# Patient Record
Sex: MALE | Race: WHITE | NOT HISPANIC OR LATINO | Employment: FULL TIME | URBAN - METROPOLITAN AREA
[De-identification: names, ages, dates, MRNs, and addresses within clinical notes are randomized per-mention and may not be internally consistent; named-entity substitution may affect disease eponyms.]

---

## 2017-12-03 ENCOUNTER — APPOINTMENT (EMERGENCY)
Dept: RADIOLOGY | Facility: HOSPITAL | Age: 62
End: 2017-12-03
Payer: COMMERCIAL

## 2017-12-03 ENCOUNTER — HOSPITAL ENCOUNTER (EMERGENCY)
Facility: HOSPITAL | Age: 62
Discharge: HOME | End: 2017-12-03
Attending: EMERGENCY MEDICINE | Admitting: EMERGENCY MEDICINE
Payer: COMMERCIAL

## 2017-12-03 VITALS
WEIGHT: 175 LBS | SYSTOLIC BLOOD PRESSURE: 154 MMHG | HEART RATE: 87 BPM | RESPIRATION RATE: 16 BRPM | BODY MASS INDEX: 23.7 KG/M2 | HEIGHT: 72 IN | DIASTOLIC BLOOD PRESSURE: 83 MMHG | OXYGEN SATURATION: 95 % | TEMPERATURE: 98.6 F
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
## 2017-12-03 DIAGNOSIS — S42.022A CLOSED DISPLACED FRACTURE OF SHAFT OF LEFT CLAVICLE, INITIAL ENCOUNTER: Primary | ICD-10-CM

## 2017-12-03 PROCEDURE — 71020 HB CHEST X-RAY 2VW FRONTAL&LATL: CPT

## 2017-12-03 PROCEDURE — 99283 EMERGENCY DEPT VISIT LOW MDM: CPT

## 2017-12-03 PROCEDURE — 73030 X-RAY EXAM OF SHOULDER: CPT

## 2017-12-03 PROCEDURE — 96372 THER/PROPH/DIAG INJ SC/IM: CPT

## 2017-12-03 PROCEDURE — 73000 X-RAY EXAM OF COLLAR BONE: CPT

## 2017-12-03 RX ORDER — OXYCODONE HYDROCHLORIDE AND ACETAMINOPHEN 5; 325 MG/1; MG/1
1 TABLET ORAL EVERY 6 HOURS PRN
Qty: 15 TABLET | Refills: 0 | Status: SHIPPED | OUTPATIENT
Start: 2017-12-03 | End: 2017-12-06

## 2017-12-03 RX ORDER — CYCLOBENZAPRINE HCL 5 MG
5 TABLET ORAL 3 TIMES DAILY PRN
Qty: 21 TABLET | Refills: 0 | Status: SHIPPED | OUTPATIENT
Start: 2017-12-03 | End: 2017-12-10

## 2017-12-03 RX ORDER — KETOROLAC TROMETHAMINE 30 MG/ML
30 INJECTION, SOLUTION INTRAMUSCULAR; INTRAVENOUS ONCE
Status: COMPLETED | OUTPATIENT
Start: 2017-12-03 | End: 2017-12-03

## 2017-12-03 RX ORDER — LIDOCAINE 50 MG/G
2 PATCH TOPICAL DAILY
Qty: 30 PATCH | Refills: 0 | Status: SHIPPED | OUTPATIENT
Start: 2017-12-03

## 2017-12-03 RX ORDER — NAPROXEN 500 MG/1
500 TABLET ORAL 2 TIMES DAILY WITH MEALS
Qty: 20 TABLET | Refills: 0 | Status: SHIPPED | OUTPATIENT
Start: 2017-12-03 | End: 2017-12-13

## 2017-12-03 RX ADMIN — KETOROLAC TROMETHAMINE 30 MG: 30 INJECTION, SOLUTION INTRAMUSCULAR at 16:03

## 2017-12-03 NOTE — ED PROVIDER NOTES
History  Chief Complaint   Patient presents with    Shoulder Injury     alexasimon states that he fell while skiing and landed on his left shoulder  c/o pain     15-year-old male without past medical history presenting with chief complaint left shoulder injury  Around 11 o'clock this morning the [place] he [place] he landed on his left shoulder he had severe pain localized to his left anterior shoulder  He presents several hours later for evaluation comma the pain is in his left shoulder is nonradiating it is worse when he tries to move his arm it is better with rest he is now holding his left arm in a position of comfort he states that the muscles in his left chest wall feel tight and he has some discomfort from holding his arm in a position of comfort otherwise he denies striking his head he denies anti-platelet or anticoagulant medications he denies neck pain or stiffness he denies weakness paresthesias or anesthesia denies other chest pain or discomfort shortness of breath cough or hemoptysis denies abdominal flank or back pain nausea vomiting other muscle skeletal complaint or injury  Complete review systems otherwise negative as noted            None       History reviewed  No pertinent past medical history  History reviewed  No pertinent surgical history  History reviewed  No pertinent family history  I have reviewed and agree with the history as documented  Social History   Substance Use Topics    Smoking status: Never Smoker    Smokeless tobacco: Never Used    Alcohol use Yes      Comment: 6 pack/week        Review of Systems   Constitutional: Negative for chills and fever  Eyes: Negative for photophobia and pain  Respiratory: Negative for cough and shortness of breath  Cardiovascular: Negative for chest pain and palpitations  Gastrointestinal: Negative for abdominal pain, diarrhea, nausea and vomiting  Musculoskeletal: Positive for joint swelling  Negative for back pain, neck pain and neck stiffness  Left shoulder pain   Skin: Negative for color change, rash and wound  Neurological: Negative for dizziness, weakness, numbness and headaches  Hematological: Negative for adenopathy  Does not bruise/bleed easily  Psychiatric/Behavioral: Negative for agitation and behavioral problems  All other systems reviewed and are negative  Physical Exam  ED Triage Vitals [12/03/17 1254]   Temperature Pulse Respirations Blood Pressure SpO2   98 6 °F (37 °C) 87 16 154/83 95 %      Temp Source Heart Rate Source Patient Position - Orthostatic VS BP Location FiO2 (%)   Temporal Monitor Sitting Right arm --      Pain Score       No Pain           Orthostatic Vital Signs  Vitals:    12/03/17 1254   BP: 154/83   Pulse: 87   Patient Position - Orthostatic VS: Sitting       Physical Exam   Constitutional: He is oriented to person, place, and time  He appears well-developed and well-nourished  No distress  Well-appearing in no acute distress   HENT:   Head: Normocephalic and atraumatic  Eyes: EOM are normal  Pupils are equal, round, and reactive to light  Neck: Normal range of motion  Neck supple  No tracheal deviation present  Cardiovascular: Normal rate, regular rhythm and normal heart sounds  Exam reveals no gallop and no friction rub  No murmur heard  Pulmonary/Chest: Effort normal and breath sounds normal  He has no wheezes  He has no rales  Abdominal: Soft  Bowel sounds are normal  He exhibits no distension  There is no tenderness  There is no rebound and no guarding     Musculoskeletal:   Full active and passive range of motion of his right upper extremity bilateral lower extremities without pain tenderness or injury he has no midline cervical thoracic or lumbar tenderness his back is atraumatic and nontender he has no tenderness over his chest or abdomen he specifically has no tenderness over his left chest wall there is no crepitus, there is some minimal swelling without injury to the skin over his left clavicle there is no tenting he is mildly tender over the anterior shoulder as well although there is no tenderness over her scapula her back there is no tenderness over the humerus elbow forearm hand or wrist the distal left upper extremities neurovascularly intact including AIN PIN, 2+ pulses sensation intact globally, no other injuries noted   Neurological: He is alert and oriented to person, place, and time  No cranial nerve deficit  He exhibits normal muscle tone  Coordination normal    Skin: Skin is warm and dry  No rash noted  Psychiatric: He has a normal mood and affect  His behavior is normal    Nursing note and vitals reviewed  ED Medications  Medications   ketorolac (TORADOL) 30 mg/mL injection 30 mg (30 mg Intramuscular Given 12/3/17 1603)       Diagnostic Studies  Results Reviewed     None                 XR chest 2 views   Final Result by Kristal Reeves MD (12/03 1542)      Acute left clavicle fracture    Clear lungs         Workstation performed: [id number]         XR clavicle LEFT   Final Result by Kristal Reeves MD (12/03 1542)      Acute left clavicle fracture         Workstation performed: [id number]         XR shoulder 2+ views LEFT   Final Result by Kristal Reeves MD (12/03 1541)      Acute left clavicle fracture         Workstation performed: [id number]                    Procedures  Procedures       Phone Contacts  ED Phone Contact    ED Course  ED Course as of Dec 04 1233   Sun Dec 03, 2017   1554 Patient has midshaft left clavicle fracture there is no skin tenting there is no pneumothorax, minimal pain at this time he is right-hand dominant, understands follow up with Orthopedics for definitive management placed in sling pain control close return instructions patient agreeable plan                                MDM  Number of Diagnoses or Management Options  Closed displaced fracture of shaft of left clavicle, initial encounter:   Injury due to skiing accident, initial encounter:   Diagnosis management comments: 59-year-old male denies past medical history presenting several hours after landing on his left shoulder while skiing, pain is located to his left anterior shoulder has minimal swelling over his clavicle without skin tenting or poke hole/ injury to skin, distal arms neurovascularly intact no other injuries noted he has normal vital signs appears comfortable, will get x-ray of the left shoulder and clavicle comma chest comma will treat pain, disposition pending further evaluation and reassessment though likely discharge with orthopedic follow-up and return precautions    CritCare Time    Disposition  Final diagnoses:   Closed displaced fracture of shaft of left clavicle, initial encounter   Injury due to skiing accident, initial encounter     Time reflects when diagnosis was documented in both MDM as applicable and the Disposition within this note     Time User Action Codes Description Comment    12/3/2017  3:57 PM Ceferino Kong 224 Closed displaced fracture of shaft of left clavicle, initial encounter     12/3/2017  3:57 PM Vineet Martin Add Pelontus Natalie  FQBN] XXRC, initial encounter     12/3/2017  3:57 PM Vineet Martin Remove Luctus Natalie  WANL] Fall, initial encounter     12/3/2017  3:57 PM Chetna Kong Add [V00 328A] Injury due to skiing accident, initial encounter       ED Disposition     ED Disposition Condition Comment    Discharge  Andry Sarahme discharge to home/self care      Condition at discharge: Good        Follow-up Information     Follow up With Specialties Details Why Contact Info Additional Information    [facility] Emergency Department Emergency Medicine  If symptoms worsen [address] [facility] [address]  [phone] [place] ED, [address], [place], [address] [facility] [address] [facility] Surgery In 1 week  [address] [facility] [address]  [address], [place] Orthopedic Surgery In 1 week  [address] [facility]           Discharge Medication List as of 12/3/2017  3:59 PM      START taking these medications    Details   cyclobenzaprine (FLEXERIL) 5 mg tablet Take 1 tablet by mouth 3 (three) times a day as needed for muscle spasms for up to 7 days, Starting Sun 12/3/2017, Until Sun 12/10/2017, Print      lidocaine (LIDODERM) 5 % Place 2 patches on the skin daily Remove & Discard patch within 12 hours or as directed by MD, Starting Sun 12/3/2017, Print      naproxen (NAPROSYN) 500 mg tablet Take 1 tablet by mouth 2 (two) times a day with meals for 10 days, Starting Sun 12/3/2017, Until Wed 12/13/2017, Print      oxyCODONE-acetaminophen (PERCOCET) 5-325 mg per tablet Take 1 tablet by mouth every 6 (six) hours as needed for moderate pain for up to 3 days Max Daily Amount: 4 tablets, Starting Sun 12/3/2017, Until Wed 12/6/2017, Print           No discharge procedures on file      ED Provider  Electronically Signed by           Zach Shay DO  12/04/17 9121

## 2017-12-03 NOTE — DISCHARGE INSTRUCTIONS
Please keep the arm in the sling return if you have severe chest pain shortness of breath numbness in her hand fingers or blue you have worsening or other concerning symptoms as instructed otherwise they are must stay in the sling and follow up with Orthopedics in 1 week for further evaluation and definitive treatment as discussed    Clavicle Fracture   WHAT YOU NEED TO KNOW:   A clavicle fracture is a crack or break in your clavicle (collarbone)  DISCHARGE INSTRUCTIONS:   Return to the emergency department if:   · Your shoulder, arm, hand, or fingers turn bluish or pale, or feel cold or numb  · Your pain gets worse, even after rest and medicine  · Your splint feels tight, or you have increased swelling  · You cannot move your fingers  Contact your healthcare provider if:   · Your sling or wrap comes off or gets damaged  · You have questions about your condition or care  Medicines: You may  need any of the following:  · Acetaminophen  decreases pain and fever  It is available without a doctor's order  Ask how much to take and how often to take it  Follow directions  Read the labels of all other medicines you are using to see if they also contain acetaminophen, or ask your doctor or pharmacist  Acetaminophen can cause liver damage if not taken correctly  Do not use more than 4 grams (4,000 milligrams) total of acetaminophen in one day  · NSAIDs , such as ibuprofen, help decrease swelling, pain, and fever  This medicine is available with or without a doctor's order  NSAIDs can cause stomach bleeding or kidney problems in certain people  If you take blood thinner medicine, always ask your healthcare provider if NSAIDs are safe for you  Always read the medicine label and follow directions  · Take your medicine as directed  Contact your healthcare provider if you think your medicine is not helping or if you have side effects  Tell him or her if you are allergic to any medicine   Keep a list of the medicines, vitamins, and herbs you take  Include the amounts, and when and why you take them  Bring the list or the pill bottles to follow-up visits  Carry your medicine list with you in case of an emergency  Follow up with your healthcare provider within 1 week or as directed: You may need to return for more x-rays to see how well your clavicle is healing  Write down your questions so you remember to ask them during your visits  Sling or brace care: You will have a sling or a brace to keep your clavicle from moving while it heals  Ask your healthcare provider for more information on how to care for the sling or brace, including how to adjust it  Ice:  Apply ice on your clavicle for 15 to 20 minutes every hour or as directed  Use an ice pack, or put crushed ice in a plastic bag  Cover it with a towel  Ice decreases swelling and pain  Activity:  Limit activity as directed by your healthcare provider  Slowly start to do more each day as the pain decreases  Physical therapy:  Your healthcare provider may recommend physical therapy after your clavicle heals  A physical therapist teaches you exercises to help improve movement and strength, and to decrease pain  © 2017 [employer]  Information is for End User's use only and may not be sold, redistributed or otherwise used for commercial purposes  All illustrations and images included in CareNotes® are the copyrighted property of [employer]  or Sharad Pollard  The above information is an  only  It is not intended as medical advice for individual conditions or treatments  Talk to your doctor, nurse or pharmacist before following any medical regimen to see if it is safe and effective for you

## 2019-02-21 ENCOUNTER — APPOINTMENT (EMERGENCY)
Dept: CT IMAGING | Facility: HOSPITAL | Age: 64
End: 2019-02-21
Payer: COMMERCIAL

## 2019-02-21 ENCOUNTER — HOSPITAL ENCOUNTER (EMERGENCY)
Facility: HOSPITAL | Age: 64
Discharge: HOME | End: 2019-02-22
Attending: EMERGENCY MEDICINE | Admitting: EMERGENCY MEDICINE
Payer: COMMERCIAL
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
## 2019-02-21 DIAGNOSIS — S20.219A CHEST WALL CONTUSION: ICD-10-CM

## 2019-02-21 LAB
ALBUMIN SERPL BCP-MCNC: 3.4 G/DL (ref 3.5–5)
ALP SERPL-CCNC: 53 U/L (ref 46–116)
ALT SERPL W P-5'-P-CCNC: 30 U/L (ref 12–78)
ANION GAP SERPL CALCULATED.3IONS-SCNC: 1 MMOL/L (ref 4–13)
AST SERPL W P-5'-P-CCNC: 17 U/L (ref 5–45)
BASOPHILS # BLD AUTO: 0.04 THOUSANDS/ΜL (ref 0–0.1)
BASOPHILS NFR BLD AUTO: 1 % (ref 0–1)
BILIRUB SERPL-MCNC: 0.4 MG/DL (ref 0.2–1)
BUN SERPL-MCNC: 22 MG/DL (ref 5–25)
CALCIUM SERPL-MCNC: 8.6 MG/DL (ref 8.3–10.1)
CHLORIDE SERPL-SCNC: 103 MMOL/L (ref 100–108)
CO2 SERPL-SCNC: 26 MMOL/L (ref 21–32)
CREAT SERPL-MCNC: 1.11 MG/DL (ref 0.6–1.3)
EOSINOPHIL # BLD AUTO: 0.11 THOUSAND/ΜL (ref 0–0.61)
EOSINOPHIL NFR BLD AUTO: 2 % (ref 0–6)
ERYTHROCYTE [DISTWIDTH] IN BLOOD BY AUTOMATED COUNT: 12.3 % (ref 11.6–15.1)
GFR SERPL CREATININE-BSD FRML MDRD: 70 ML/MIN/1.73SQ M
GLUCOSE SERPL-MCNC: 101 MG/DL (ref 65–140)
HCT VFR BLD AUTO: 42.3 % (ref 36.5–49.3)
HGB BLD-MCNC: 14.2 G/DL (ref 12–17)
IMM GRANULOCYTES # BLD AUTO: 0.01 THOUSAND/UL (ref 0–0.2)
IMM GRANULOCYTES NFR BLD AUTO: 0 % (ref 0–2)
LYMPHOCYTES # BLD AUTO: 1.53 THOUSANDS/ΜL (ref 0.6–4.47)
LYMPHOCYTES NFR BLD AUTO: 28 % (ref 14–44)
MCH RBC QN AUTO: 32.6 PG (ref 26.8–34.3)
MCHC RBC AUTO-ENTMCNC: 33.6 G/DL (ref 31.4–37.4)
MCV RBC AUTO: 97 FL (ref 82–98)
MONOCYTES # BLD AUTO: 0.64 THOUSAND/ΜL (ref 0.17–1.22)
MONOCYTES NFR BLD AUTO: 12 % (ref 4–12)
NEUTROPHILS # BLD AUTO: 3.16 THOUSANDS/ΜL (ref 1.85–7.62)
NEUTS SEG NFR BLD AUTO: 57 % (ref 43–75)
NRBC BLD AUTO-RTO: 0 /100 WBCS
PLATELET # BLD AUTO: 292 THOUSANDS/UL (ref 149–390)
PMV BLD AUTO: 9.6 FL (ref 8.9–12.7)
POTASSIUM SERPL-SCNC: 3.6 MMOL/L (ref 3.5–5.3)
PROT SERPL-MCNC: 6.5 G/DL (ref 6.4–8.2)
RBC # BLD AUTO: 4.36 MILLION/UL (ref 3.88–5.62)
SODIUM SERPL-SCNC: 130 MMOL/L (ref 136–145)
TROPONIN I SERPL-MCNC: <0.02 NG/ML
WBC # BLD AUTO: 5.49 THOUSAND/UL (ref 4.31–10.16)

## 2019-02-21 PROCEDURE — 85025 COMPLETE CBC W/AUTO DIFF WBC: CPT | Performed by: EMERGENCY MEDICINE

## 2019-02-21 PROCEDURE — 84484 ASSAY OF TROPONIN QUANT: CPT | Performed by: EMERGENCY MEDICINE

## 2019-02-21 PROCEDURE — 71260 CT THORAX DX C+: CPT

## 2019-02-21 PROCEDURE — 99284 EMERGENCY DEPT VISIT MOD MDM: CPT

## 2019-02-21 PROCEDURE — 96374 THER/PROPH/DIAG INJ IV PUSH: CPT

## 2019-02-21 PROCEDURE — 72125 CT NECK SPINE W/O DYE: CPT

## 2019-02-21 PROCEDURE — 70450 CT HEAD/BRAIN W/O DYE: CPT

## 2019-02-21 PROCEDURE — 93005 ELECTROCARDIOGRAM TRACING: CPT

## 2019-02-21 PROCEDURE — 80053 COMPREHEN METABOLIC PANEL: CPT | Performed by: EMERGENCY MEDICINE

## 2019-02-21 PROCEDURE — 36415 COLL VENOUS BLD VENIPUNCTURE: CPT | Performed by: EMERGENCY MEDICINE

## 2019-02-21 RX ORDER — DIAZEPAM 5 MG/ML
5 INJECTION, SOLUTION INTRAMUSCULAR; INTRAVENOUS ONCE
Status: COMPLETED | OUTPATIENT
Start: 2019-02-21 | End: 2019-02-21

## 2019-02-21 RX ADMIN — IOHEXOL 85 ML: 350 INJECTION, SOLUTION INTRAVENOUS at 23:24

## 2019-02-21 RX ADMIN — Medication 5 MG: at 23:09

## 2019-02-22 VITALS
OXYGEN SATURATION: 100 % | TEMPERATURE: 97.8 F | BODY MASS INDEX: 26.16 KG/M2 | WEIGHT: 193.12 LBS | DIASTOLIC BLOOD PRESSURE: 87 MMHG | RESPIRATION RATE: 18 BRPM | HEART RATE: 54 BPM | HEIGHT: 72 IN | SYSTOLIC BLOOD PRESSURE: 156 MMHG

## 2019-02-22 LAB
ATRIAL RATE: 61 BPM
P AXIS: 54 DEGREES
PR INTERVAL: 176 MS
QRS AXIS: 36 DEGREES
QRSD INTERVAL: 90 MS
QT INTERVAL: 402 MS
QTC INTERVAL: 404 MS
T WAVE AXIS: 36 DEGREES
VENTRICULAR RATE: 61 BPM

## 2019-02-22 PROCEDURE — 93010 ELECTROCARDIOGRAM REPORT: CPT | Performed by: INTERNAL MEDICINE

## 2019-02-22 RX ORDER — IBUPROFEN 600 MG/1
600 TABLET ORAL EVERY 6 HOURS PRN
Qty: 30 TABLET | Refills: 0 | Status: SHIPPED | OUTPATIENT
Start: 2019-02-22 | End: 2019-02-25

## 2019-02-22 RX ORDER — IBUPROFEN 600 MG/1
600 TABLET ORAL ONCE
Status: COMPLETED | OUTPATIENT
Start: 2019-02-22 | End: 2019-02-22

## 2019-02-22 RX ORDER — CYCLOBENZAPRINE HCL 10 MG
10 TABLET ORAL 2 TIMES DAILY PRN
Qty: 20 TABLET | Refills: 0 | Status: SHIPPED | OUTPATIENT
Start: 2019-02-22

## 2019-02-22 RX ADMIN — IBUPROFEN 600 MG: 600 TABLET ORAL at 02:08

## 2019-02-22 NOTE — ED PROVIDER NOTES
History  Chief Complaint   Patient presents with    Fall     Pt c/o falling tonight on ice and landing on back/shoulders and then hit head  Pt states that he is tired and neck pain  - thinners  54-year-old male patient presents to the emergency department for evaluation of a trip and fall on ice  The patient states he landed on his shoulders and then landed on the back of his head  The patient was not knocked unconscious  Patient has myriad of symptoms currently including headache which is reproducible with tenderness at the insertion point of the trapezius  The patient has no midline cervical spine tenderness but does have lateral cervical spine tenderness because the patient's symptoms very well have a head CT as well as a cervical spine CT done  The patient states he feels that his breathing is a bit heavier than it was before, he has a history of multiple rib fractures in the past is concerned that he re-injured mother's ribs the patient had a CT scan of the chest done as well  History provided by:  Patient   used: No    Fall   Mechanism of injury: fall    Injury location:  Head/neck  Head/neck injury location:  Head  Arrived directly from scene: no    Fall:     Impact surface:  Unable to specify    Entrapped after fall: no    Protective equipment: none    Suspicion of alcohol use: no    Suspicion of drug use: no    Associated symptoms: chest pain and difficulty breathing    Associated symptoms: no abdominal pain, no back pain, no blindness, no headaches, no hearing loss and no seizures    Risk factors: no anticoagulation therapy, no asthma, no beta blocker therapy, no COPD, no dialysis, no hemophilia, no past MI, not pregnant and no steroid use        Prior to Admission Medications   Prescriptions Last Dose Informant Patient Reported? Taking?    cyclobenzaprine (FLEXERIL) 5 mg tablet   No No   Sig: Take 1 tablet by mouth 3 (three) times a day as needed for muscle spasms for up to 7 days   lidocaine (LIDODERM) 5 %   No No   Sig: Place 2 patches on the skin daily Remove & Discard patch within 12 hours or as directed by MD   naproxen (NAPROSYN) 500 mg tablet   No No   Sig: Take 1 tablet by mouth 2 (two) times a day with meals for 10 days      Facility-Administered Medications: None       History reviewed  No pertinent past medical history  History reviewed  No pertinent surgical history  History reviewed  No pertinent family history  I have reviewed and agree with the history as documented  Social History     Tobacco Use    Smoking status: Never Smoker    Smokeless tobacco: Never Used   Substance Use Topics    Alcohol use: Yes     Comment: 6 pack/week    Drug use: No        Review of Systems   HENT: Negative for hearing loss  Eyes: Negative for blindness  Cardiovascular: Positive for chest pain  Gastrointestinal: Negative for abdominal pain  Musculoskeletal: Negative for back pain  Neurological: Negative for seizures and headaches  All other systems reviewed and are negative  Physical Exam  Physical Exam   Nursing note and vitals reviewed        Vital Signs  ED Triage Vitals [02/21/19 2235]   Temperature Pulse Respirations Blood Pressure SpO2   97 8 °F (36 6 °C) 57 18 165/98 99 %      Temp Source Heart Rate Source Patient Position - Orthostatic VS BP Location FiO2 (%)   Oral Monitor Sitting Left arm --      Pain Score       No Pain           Vitals:    02/21/19 2235 02/22/19 0209   BP: 165/98 156/87   Pulse: 57 (!) 54   Patient Position - Orthostatic VS: Sitting Sitting       Visual Acuity  Visual Acuity      Most Recent Value   L Pupil Size (mm)  3   R Pupil Size (mm)  3          ED Medications  Medications   diazepam (VALIUM) injection 5 mg (5 mg Intravenous Given 2/21/19 2309)   iohexol (OMNIPAQUE) 350 MG/ML injection (MULTI-DOSE) 100 mL (85 mL Intravenous Given 2/21/19 2324)   ibuprofen (MOTRIN) tablet 600 mg (600 mg Oral Given 2/22/19 0208) Diagnostic Studies  Results Reviewed     Procedure Component Value Units Date/Time    Troponin I [[id number]]  (Normal) Collected:  02/21/19 2306    Lab Status:  Final result Specimen:  Blood from Arm, Right Updated:  02/21/19 2329     Troponin I <0 02 ng/mL     Comprehensive metabolic panel [[id number]]  (Abnormal) Collected:  02/21/19 2306    Lab Status:  Final result Specimen:  Blood from Arm, Right Updated:  02/21/19 2327     Sodium 130 mmol/L      Potassium 3 6 mmol/L      Chloride 103 mmol/L      CO2 26 mmol/L      ANION GAP 1 mmol/L      BUN 22 mg/dL      Creatinine 1 11 mg/dL      Glucose 101 mg/dL      Calcium 8 6 mg/dL      AST 17 U/L      ALT 30 U/L      Alkaline Phosphatase 53 U/L      Total Protein 6 5 g/dL      Albumin 3 4 g/dL      Total Bilirubin 0 40 mg/dL      eGFR 70 ml/min/1 73sq m     Narrative:       National Kidney Disease Education Program recommendations are as follows:  GFR calculation is accurate only with a steady state creatinine  Chronic Kidney disease less than 60 ml/min/1 73 sq  meters  Kidney failure less than 15 ml/min/1 73 sq  meters      CBC and differential [[id number]] Collected:  02/21/19 2306    Lab Status:  Final result Specimen:  Blood from Arm, Right Updated:  02/21/19 2311     WBC 5 49 Thousand/uL      RBC 4 36 Million/uL      Hemoglobin 14 2 g/dL      Hematocrit 42 3 %      MCV 97 fL      MCH 32 6 pg      MCHC 33 6 g/dL      RDW 12 3 %      MPV 9 6 fL      Platelets 519 Thousands/uL      nRBC 0 /100 WBCs      Neutrophils Relative 57 %      Immat GRANS % 0 %      Lymphocytes Relative 28 %      Monocytes Relative 12 %      Eosinophils Relative 2 %      Basophils Relative 1 %      Neutrophils Absolute 3 16 Thousands/µL      Immature Grans Absolute 0 01 Thousand/uL      Lymphocytes Absolute 1 53 Thousands/µL      Monocytes Absolute 0 64 Thousand/µL      Eosinophils Absolute 0 11 Thousand/µL      Basophils Absolute 0 04 Thousands/µL                  CT head without contrast Final Result by Milo Sainz MD (02/22 9345)      No acute intracranial abnormality  Workstation performed: [id number]         CT spine cervical without contrast   Final Result by Milo Sainz MD (02/22 0104)      No cervical spine fracture or traumatic malalignment  Mild multilevel degenerative changes are present  Workstation performed: [id number]         CT chest with contrast   Final Result by Milo Sainz MD (02/22 0118)      No evidence of pulmonary contusion  No evidence of pneumothorax  There is biapical pleural-parenchymal scarring  Mild bilateral gynecomastia  There is an approximately 3 x 2 3 cm hypodense lesion in the right lobe of the liver  This may possibly represent a hemangioma  Nonemergent outpatient follow-up is suggested  There is also a small cyst in the dome of the liver  The study was marked in [facility] for immediate notification  Workstation performed: [id number]                    Procedures  Procedures       Phone Contacts  ED Phone Contact    ED Course                               MDM  Number of Diagnoses or Management Options  Chest wall contusion: new and requires workup  Fall, initial encounter: new and requires workup     Amount and/or Complexity of Data Reviewed  Clinical lab tests: reviewed and ordered  Tests in the radiology section of CPT®: ordered and reviewed  Decide to obtain previous medical records or to obtain history from someone other than the patient: yes  Review and summarize past medical records: yes    Patient Progress  Patient progress: stable      Disposition  Final diagnoses:   Fall, initial encounter   Chest wall contusion     Time reflects when diagnosis was documented in both MDM as applicable and the Disposition within this note     Time User Action Codes Description Comment    2/22/2019  1:27 AM Anika Bajwa Add [T11  XKFD] Fall, initial encounter     2/22/2019  1:27 AM Ricardo Dill, Ana Cristina Lao Herve Palafox Chest wall contusion       ED Disposition     ED Disposition Condition Date/Time Comment    Discharge Stable Fri Feb 22, 2019  1:27 AM Finn Fagan discharge to home/self care  Follow-up Information     Follow up With Specialties Details Why [address]    [address]  [address] [facility]  [phone]            Discharge Medication List as of 2/22/2019  1:29 AM      START taking these medications    Details   ibuprofen (MOTRIN) 600 mg tablet Take 1 tablet (600 mg total) by mouth every 6 (six) hours as needed for mild pain for up to 3 days, Starting Fri 2/22/2019, Until Mon 2/25/2019, Print         CONTINUE these medications which have CHANGED    Details   cyclobenzaprine (FLEXERIL) 10 mg tablet Take 1 tablet (10 mg total) by mouth 2 (two) times a day as needed for muscle spasms, Starting Fri 2/22/2019, Print         CONTINUE these medications which have NOT CHANGED    Details   lidocaine (LIDODERM) 5 % Place 2 patches on the skin daily Remove & Discard patch within 12 hours or as directed by MD, Starting Sun 12/3/2017, Print      naproxen (NAPROSYN) 500 mg tablet Take 1 tablet by mouth 2 (two) times a day with meals for 10 days, Starting Sun 12/3/2017, Until Wed 12/13/2017, Print           No discharge procedures on file      ED Provider  Electronically Signed by           Nita Crook,   02/22/19 8822

## 2022-05-06 ENCOUNTER — OFFICE VISIT (OUTPATIENT)
Dept: URGENT CARE | Facility: CLINIC | Age: 67
End: 2022-05-06
Payer: COMMERCIAL

## 2022-05-06 VITALS
DIASTOLIC BLOOD PRESSURE: 73 MMHG | WEIGHT: 185 LBS | HEART RATE: 87 BPM | OXYGEN SATURATION: 97 % | SYSTOLIC BLOOD PRESSURE: 129 MMHG | BODY MASS INDEX: 25.06 KG/M2 | RESPIRATION RATE: 18 BRPM | HEIGHT: 72 IN | TEMPERATURE: 99.5 F

## 2022-05-06 DIAGNOSIS — L23.9 ALLERGIC CONTACT DERMATITIS, UNSPECIFIED TRIGGER: Primary | ICD-10-CM

## 2022-05-06 PROCEDURE — S9083 URGENT CARE CENTER GLOBAL: HCPCS | Performed by: PHYSICIAN ASSISTANT

## 2022-05-06 PROCEDURE — 99213 OFFICE O/P EST LOW 20 MIN: CPT | Performed by: PHYSICIAN ASSISTANT

## 2022-05-06 RX ORDER — CLOBETASOL PROPIONATE 0.5 MG/G
CREAM TOPICAL 2 TIMES DAILY
Qty: 45 G | Refills: 0 | Status: SHIPPED | OUTPATIENT
Start: 2022-05-06

## 2022-05-06 RX ORDER — PREDNISONE 20 MG/1
40 TABLET ORAL DAILY
Qty: 14 TABLET | Refills: 0 | Status: SHIPPED | OUTPATIENT
Start: 2022-05-06 | End: 2022-05-13

## 2022-05-06 NOTE — PATIENT INSTRUCTIONS
Contact Dermatitis   WHAT YOU NEED TO KNOW:   Contact dermatitis is a skin rash  It develops when you touch something that irritates your skin or causes an allergic reaction  DISCHARGE INSTRUCTIONS:   Call your local emergency number (911 in the [address]) if:   · You have sudden trouble breathing  · Your throat swells and you have trouble eating  · Your face is swollen  Call your doctor or dermatologist if:   · You have a fever  · Your blisters are draining pus  · Your rash spreads or does not get better, even after treatment  · You have questions or concerns about your condition or care  Medicines:   · Medicines  help decrease itching and swelling  They will be given as a topical medicine to apply to your rash or as a pill  · Take your medicine as directed  Contact your healthcare provider if you think your medicine is not helping or if you have side effects  Tell him or her if you are allergic to any medicine  Keep a list of the medicines, vitamins, and herbs you take  Include the amounts, and when and why you take them  Bring the list or the pill bottles to follow-up visits  Carry your medicine list with you in case of an emergency  Manage contact dermatitis:   · Take short baths or showers in cool water  Use mild soap or soap-free cleansers  Add oatmeal, baking soda, or cornstarch to the bath water to help decrease skin irritation  · Avoid skin irritants , such as makeup, hair products, soaps, and cleansers  Use products that do not contain perfume or dye  · Apply a cool compress to your rash  This will help soothe your skin  · Apply lotions or creams to the area  These help keep your skin moist and decrease itching  Apply the lotion or cream right after a lukewarm bath or shower when your skin is still damp  Use products that do not contain a scent      Follow up with your doctor or dermatologist in 2 to 3 days:  Write down your questions so you remember to ask them during your visits  © Copyright [employer] 2022 Information is for End User's use only and may not be sold, redistributed or otherwise used for commercial purposes  All illustrations and images included in CareNotes® are the copyrighted property of [employer]  or [employer]  The above information is an  only  It is not intended as medical advice for individual conditions or treatments  Talk to your doctor, nurse or pharmacist before following any medical regimen to see if it is safe and effective for you

## 2022-05-06 NOTE — PROGRESS NOTES
[facility]        NAME: Teena Arriaga is a 79 y o  male  : 1955    MRN: [id number]  DATE: May 6, 2022  TIME: 1:10 PM    Assessment and Plan   Allergic contact dermatitis, unspecified trigger [L23 9]  1  Allergic contact dermatitis, unspecified trigger  predniSONE 20 mg tablet    clobetasol (TEMOVATE) 0 05 % cream         Patient Instructions       Follow up with PCP in 3-5 days  Proceed to  ER if symptoms worsen or for any difficulty breathing     Chief Complaint     Chief Complaint   Patient presents with    Rash     all over back anf legs     Fever     of 105 last night          History of Present Illness       Patient is a 80 yo male who presents for evaluation of rash for a few days  The rash is located all over his back, chest/abdomen, and legs  He states that he washed a shirt and pair of pants with new laundry detergent and noticed the rash after  States it is extremely pruritic  He reports that last night he had a fever of 105  Denies SOB and throat swelling      Review of Systems   Review of Systems   Constitutional: Positive for fever  HENT: Negative for facial swelling and trouble swallowing  Respiratory: Negative for chest tightness, shortness of breath, wheezing and stridor  Cardiovascular: Negative for chest pain  Musculoskeletal: Negative for joint swelling  Skin: Positive for rash  Neurological: Negative for numbness           Current Medications       Current Outpatient Medications:     cyclobenzaprine (FLEXERIL) 10 mg tablet, Take 1 tablet (10 mg total) by mouth 2 (two) times a day as needed for muscle spasms, Disp: 20 tablet, Rfl: 0    lidocaine (LIDODERM) 5 %, Place 2 patches on the skin daily Remove & Discard patch within 12 hours or as directed by MD, Disp: 30 patch, Rfl: 0    clobetasol (TEMOVATE) 0 05 % cream, Apply topically 2 (two) times a day, Disp: 45 g, Rfl: 0    ibuprofen (MOTRIN) 600 mg tablet, Take 1 tablet (600 mg total) by mouth every 6 (six) hours as needed for mild pain for up to 3 days, Disp: 30 tablet, Rfl: 0    naproxen (NAPROSYN) 500 mg tablet, Take 1 tablet by mouth 2 (two) times a day with meals for 10 days, Disp: 20 tablet, Rfl: 0    predniSONE 20 mg tablet, Take 2 tablets (40 mg total) by mouth daily for 7 days, Disp: 14 tablet, Rfl: 0    Current Allergies     Allergies as of 05/06/2022    (No Known Allergies)            The following portions of the patient's history were reviewed and updated as appropriate: allergies, current medications, past family history, past medical history, past social history, past surgical history and problem list      History reviewed  No pertinent past medical history  History reviewed  No pertinent surgical history  History reviewed  No pertinent family history  Medications have been verified  Objective   /73   Pulse 87   Temp 99 5 °F (37 5 °C)   Resp 18   Ht 6' (1 829 m)   Wt 83 9 kg (185 lb)   SpO2 97%   BMI 25 09 kg/m²        Physical Exam     Physical Exam  Constitutional:       Appearance: Normal appearance  HENT:      Nose: Nose normal    Eyes:      Conjunctiva/sclera: Conjunctivae normal    Cardiovascular:      Rate and Rhythm: Normal rate  Pulmonary:      Effort: Pulmonary effort is normal       Breath sounds: Normal breath sounds  Musculoskeletal:      Cervical back: Normal range of motion  Skin:     Findings: Rash present  Comments: Widespread urticarial rash on back, chest, abdomen, and legs    Neurological:      Mental Status: He is alert     Psychiatric:         Mood and Affect: Mood normal          Behavior: Behavior normal

## 2022-05-25 ENCOUNTER — OFFICE VISIT (OUTPATIENT)
Dept: URGENT CARE | Facility: CLINIC | Age: 67
End: 2022-05-25
Payer: COMMERCIAL

## 2022-05-25 VITALS
RESPIRATION RATE: 18 BRPM | TEMPERATURE: 97.7 F | SYSTOLIC BLOOD PRESSURE: 128 MMHG | DIASTOLIC BLOOD PRESSURE: 74 MMHG | HEIGHT: 72 IN | WEIGHT: 185 LBS | BODY MASS INDEX: 25.06 KG/M2 | OXYGEN SATURATION: 99 % | HEART RATE: 73 BPM

## 2022-05-25 DIAGNOSIS — M79.89 SWELLING OF LOWER EXTREMITY: ICD-10-CM

## 2022-05-25 DIAGNOSIS — L23.9 ALLERGIC CONTACT DERMATITIS, UNSPECIFIED TRIGGER: Primary | ICD-10-CM

## 2022-05-25 PROCEDURE — S9083 URGENT CARE CENTER GLOBAL: HCPCS

## 2022-05-25 PROCEDURE — 96372 THER/PROPH/DIAG INJ SC/IM: CPT

## 2022-05-25 PROCEDURE — 99213 OFFICE O/P EST LOW 20 MIN: CPT

## 2022-05-25 RX ORDER — PREDNISONE 20 MG/1
TABLET ORAL
Qty: 22 TABLET | Refills: 0 | Status: SHIPPED | OUTPATIENT
Start: 2022-05-25 | End: 2022-06-08

## 2022-05-25 RX ORDER — METHYLPREDNISOLONE SODIUM SUCCINATE 125 MG/2ML
125 INJECTION, POWDER, LYOPHILIZED, FOR SOLUTION INTRAMUSCULAR; INTRAVENOUS ONCE
Status: COMPLETED | OUTPATIENT
Start: 2022-05-25 | End: 2022-05-25

## 2022-05-25 RX ADMIN — METHYLPREDNISOLONE SODIUM SUCCINATE 125 MG: 125 INJECTION, POWDER, LYOPHILIZED, FOR SOLUTION INTRAMUSCULAR; INTRAVENOUS at 13:09

## 2022-05-25 NOTE — PATIENT INSTRUCTIONS
Take prednisone taper per instructions  Take Benadryl 50 mg every 6 hours for the next 3 days  Elevate legs frequently  If no improvement in swelling/rash, proceed to ER if unable to get in with pcp  Will need blood work

## 2022-05-25 NOTE — PROGRESS NOTES
[facility]        NAME: Richy Banda is a 79 y o  male  : 1955    MRN: [id number]  DATE: May 25, 2022  TIME: 1:56 PM    Assessment and Plan   Allergic contact dermatitis, unspecified trigger [L23 9]  1  Allergic contact dermatitis, unspecified trigger  methylPREDNISolone sodium succinate (Solu-MEDROL) injection 125 mg    predniSONE 20 mg tablet    Ambulatory Referral to Dermatology    Ambulatory Referral to [facility]   2  Swelling of lower extremity  Ambulatory Referral to Dermatology    Ambulatory Referral to [facility]    CANCELED: POCT urine dip     Attempted to check urine dip to assess kidney function, but patient unable to urinate  Referrals made for PCP, and dermatology  Treated with 2 week course of steroid taper  Patient Instructions     Take prednisone taper per instructions  Take Benadryl 50 mg every 6 hours for the next 3 days  Elevate legs frequently  If no improvement in swelling/rash, proceed to ER if unable to get in with pcp  Will need blood work  Chief Complaint     Chief Complaint   Patient presents with    Rash     All over body, severe, past 2 weeks          History of Present Illness       Patient presents today with a generalized itchy rash that has been ongoing for the past month or longer  He thinks the rash initially started after he used a different laundry detergent  He also states he recently started restoring cars and has been around different chemicals related to that  He was first seen here on 22 and treated with prednisone for rash which resolved  The patient states his present rash is an entirely new rash different from the original one, and it covers his entire body which is itchy for the past 2 weeks  Also, he started having swelling in his lower extremities which started this morning  He believes he was still on the oral prednisone when the new rash started  He has tried steroid creams and PO Benadryl with no relief  Review of Systems   Review of Systems   Constitutional: Positive for appetite change (decreased) and fever (low grade intermittent)  Negative for chills and unexpected weight change (wt loss)  HENT: Negative for congestion, postnasal drip and trouble swallowing  Respiratory: Negative for cough, shortness of breath and wheezing  Cardiovascular: Positive for leg swelling (lower extremities to upper thigh)  Negative for chest pain and palpitations  Gastrointestinal: Positive for constipation (was constipated, but has improved  )  Negative for abdominal pain, diarrhea, nausea and vomiting  Endocrine: Negative for polydipsia, polyphagia and polyuria  Genitourinary: Negative for difficulty urinating  Musculoskeletal: Negative for arthralgias, back pain and neck pain  Skin: Positive for rash  Dry flaky skin   Neurological: Negative for dizziness, light-headedness and headaches  Hematological: Negative for adenopathy  Psychiatric/Behavioral: Negative  Current Medications       Current Outpatient Medications:     predniSONE 20 mg tablet, Take 2 5 tablets (50 mg total) by mouth daily for 3 days, THEN 2 tablets (40 mg total) daily for 3 days, THEN 1 5 tablets (30 mg total) daily for 3 days, THEN 1 tablet (20 mg total) daily for 3 days, THEN 0 5 tablets (10 mg total) daily for 2 days  , Disp: 22 tablet, Rfl: 0    clobetasol (TEMOVATE) 0 05 % cream, Apply topically 2 (two) times a day (Patient not taking: Reported on 5/25/2022), Disp: 45 g, Rfl: 0    cyclobenzaprine (FLEXERIL) 10 mg tablet, Take 1 tablet (10 mg total) by mouth 2 (two) times a day as needed for muscle spasms (Patient not taking: Reported on 5/25/2022), Disp: 20 tablet, Rfl: 0    ibuprofen (MOTRIN) 600 mg tablet, Take 1 tablet (600 mg total) by mouth every 6 (six) hours as needed for mild pain for up to 3 days, Disp: 30 tablet, Rfl: 0    lidocaine (LIDODERM) 5 %, Place 2 patches on the skin daily Remove & Discard patch within 12 hours or as directed by MD (Patient not taking: Reported on 5/25/2022), Disp: 30 patch, Rfl: 0    naproxen (NAPROSYN) 500 mg tablet, Take 1 tablet by mouth 2 (two) times a day with meals for 10 days, Disp: 20 tablet, Rfl: 0  No current facility-administered medications for this visit  Current Allergies     Allergies as of 05/25/2022    (No Known Allergies)            The following portions of the patient's history were reviewed and updated as appropriate: allergies, current medications, past family history, past medical history, past social history, past surgical history and problem list      History reviewed  No pertinent past medical history  History reviewed  No pertinent surgical history  History reviewed  No pertinent family history  Medications have been verified  Objective   /74 (BP Location: Left arm, Patient Position: Sitting)   Pulse 73   Temp 97 7 °F (36 5 °C) (Temporal)   Resp 18   Ht 6' (1 829 m)   Wt 83 9 kg (185 lb)   SpO2 99%   BMI 25 09 kg/m²        Physical Exam     Physical Exam  Vitals and nursing note reviewed  Constitutional:       General: He is not in acute distress  Appearance: Normal appearance  He is not ill-appearing or toxic-appearing  HENT:      Head: Normocephalic and atraumatic  Right Ear: Tympanic membrane and ear canal normal  There is no impacted cerumen  Left Ear: Tympanic membrane and ear canal normal  There is no impacted cerumen  Ears:      Comments: External ears with thick, dry, scaly skin  Nose: Nose normal  No congestion or rhinorrhea  Mouth/Throat:      Mouth: Mucous membranes are moist       Pharynx: Oropharynx is clear  No pharyngeal swelling, oropharyngeal exudate or posterior oropharyngeal erythema  Tonsils: No tonsillar exudate  Eyes:      General:         Right eye: No discharge  Left eye: No discharge  Extraocular Movements: Extraocular movements intact  Pupils: Pupils are equal, round, and reactive to light  Cardiovascular:      Rate and Rhythm: Normal rate and regular rhythm  Pulses: Normal pulses  Dorsalis pedis pulses are 2+ on the right side and 2+ on the left side  Heart sounds: Normal heart sounds  No murmur heard  Comments: Edema to upper thigh  Denies scrotal edema  Pulmonary:      Effort: Pulmonary effort is normal  No tachypnea or respiratory distress  Breath sounds: No decreased air movement  No decreased breath sounds, wheezing, rhonchi or rales  Chest:   Breasts:      Right: Skin change present  Left: Skin change present  Comments: BL nipples dry, thick irritated skin  Abdominal:      General: Bowel sounds are normal       Tenderness: There is no abdominal tenderness  Musculoskeletal:      Right lower le+ Pitting Edema present  Left lower le+ Pitting Edema present  Lymphadenopathy:      Cervical: No cervical adenopathy  Skin:     Capillary Refill: Capillary refill takes less than 2 seconds  Findings: Rash present  Rash is urticarial       Comments: Diffuse urticarial rash to arms, scalp, chest, and back  Skin is blanchable  Scratch marks noted to BL arms  Skin is thick, red, dry and flaking  Neurological:      Mental Status: He is alert and oriented to person, place, and time  Sensory: No sensory deficit  Psychiatric:         Mood and Affect: Mood normal          Behavior: Behavior normal          Thought Content:  Thought content normal          Judgment: Judgment normal

## 2022-05-26 ENCOUNTER — TELEPHONE (OUTPATIENT)
Dept: URGENT CARE | Facility: CLINIC | Age: 67
End: 2022-05-26

## 2022-05-26 NOTE — TELEPHONE ENCOUNTER
Called patient to see how he was feeling today after his visit yesterday and receiving the IM solu-medrol for his generalized itchy rash  The patient states the rash is improving and it is no longer itchy, and was able to sleep last night  He also states the swelling to his BL LE have been improving as well  Instructed patient to follow up with the PCP, number given to him yesterday for [facility]  Also told patient if rash or leg swelling worsens, or does not improve to go to the ER for further evaluation  Patient verbalized understanding